# Patient Record
(demographics unavailable — no encounter records)

---

## 2025-02-12 NOTE — HEALTH RISK ASSESSMENT
[Independent] : managing finances [No falls in past year] : Patient reported no falls in the past year

## 2025-02-14 NOTE — PHYSICAL EXAM
[No Acute Distress] : no acute distress [Normal Voice/Communication] : normal voice communication [No JVD] : no jugular venous distention [Supple] : the neck was supple [No Respiratory Distress] : no respiratory distress [Clear to Auscultation] : lungs were clear to auscultation bilaterally [No Accessory Muscle Use] : no accessory muscle use [Normal Rate] : heart rate was normal  [Regular Rhythm] : with a regular rhythm [Normal S1, S2] : normal S1 and S2 [Pedal Pulses Present] : the pedal pulses are present [No CVA Tenderness] : no ~M costovertebral angle tenderness [No Spinal Tenderness] : no spinal tenderness [Normal Gait] : normal gait [No Clubbing, Cyanosis] : no clubbing  or cyanosis of the fingernails [Oriented x3] : oriented to person, place, and time [Normal Affect] : the affect was normal [Normal Mood] : the mood was normal [Normal Insight/Judgement] : insight and judgment were intact [Kyphosis] : no kyphosis present [Scoliosis] : scoliosis not present

## 2025-02-14 NOTE — HISTORY OF PRESENT ILLNESS
[[employer] Patient] : [unfilled] is a [employer] patient [Patient is stable] : patient is stable [Patient is stable - had PCP appoinment] : patient is stable - had PCP appointment [Patient] : patient [FreeTextEntry2] : 73 y/o female with PMH GERD, HTN, Hypothyroidism seen today for f/u visit. She is awake, alert and oriented x 3. No acute distress with no complaints at this time. Interval: -saw pcp in OCT 2024 no changes to POC -denies any changes since last visit  Subjective: 1. Appetite/Weight: Appetite good, weight stable. 2. Gait/Falls: No Falls 3. Sleep: No concerns 4. BMs: No concerns 5. Urine: No Concern 6. Mood/Memory, Memory good, Mood Good. 7. Hospitalization: denies any recent   Providers: PCP-DR. TORRES

## 2025-02-14 NOTE — HISTORY OF PRESENT ILLNESS
[[employer]s [employer] Patient] : [unfilled] is a [employer] patient [Patient is stable] : patient is stable [Patient is stable - had PCP appoinment] : patient is stable - had PCP appointment [Patient] : patient [FreeTextEntry2] : 71 y/o female with PMH GERD, HTN, Hypothyroidism seen today for f/u visit. She is awake, alert and oriented x 3. No acute distress with no complaints at this time. Interval: -saw pcp in OCT 2024 no changes to POC -denies any changes since last visit  Subjective: 1. Appetite/Weight: Appetite good, weight stable. 2. Gait/Falls: No Falls 3. Sleep: No concerns 4. BMs: No concerns 5. Urine: No Concern 6. Mood/Memory, Memory good, Mood Good. 7. Hospitalization: denies any recent   Providers: PCP-DR. TORRES